# Patient Record
Sex: FEMALE | Race: ASIAN | NOT HISPANIC OR LATINO | Employment: STUDENT | URBAN - METROPOLITAN AREA
[De-identification: names, ages, dates, MRNs, and addresses within clinical notes are randomized per-mention and may not be internally consistent; named-entity substitution may affect disease eponyms.]

---

## 2020-12-09 ENCOUNTER — OFFICE VISIT (OUTPATIENT)
Dept: INTERNAL MEDICINE | Facility: CLINIC | Age: 20
End: 2020-12-09

## 2020-12-09 ENCOUNTER — LAB (OUTPATIENT)
Dept: LAB | Facility: HOSPITAL | Age: 20
End: 2020-12-09

## 2020-12-09 VITALS
TEMPERATURE: 98.6 F | SYSTOLIC BLOOD PRESSURE: 96 MMHG | HEIGHT: 61 IN | HEART RATE: 91 BPM | BODY MASS INDEX: 20.92 KG/M2 | OXYGEN SATURATION: 96 % | DIASTOLIC BLOOD PRESSURE: 64 MMHG | WEIGHT: 110.8 LBS

## 2020-12-09 DIAGNOSIS — L70.9 ACNE, UNSPECIFIED ACNE TYPE: ICD-10-CM

## 2020-12-09 DIAGNOSIS — Z00.00 ENCOUNTER FOR ANNUAL PHYSICAL EXAM: ICD-10-CM

## 2020-12-09 DIAGNOSIS — L85.3 DRY SKIN: ICD-10-CM

## 2020-12-09 DIAGNOSIS — Z86.39 HX OF ELEVATED LIPIDS: ICD-10-CM

## 2020-12-09 DIAGNOSIS — Z86.39 HX OF ELEVATED LIPIDS: Primary | ICD-10-CM

## 2020-12-09 LAB
B-HCG UR QL: NEGATIVE
BASOPHILS # BLD AUTO: 0.05 10*3/MM3 (ref 0–0.2)
BASOPHILS NFR BLD AUTO: 0.8 % (ref 0–1.5)
DEPRECATED RDW RBC AUTO: 40.8 FL (ref 37–54)
EOSINOPHIL # BLD AUTO: 0.06 10*3/MM3 (ref 0–0.4)
EOSINOPHIL NFR BLD AUTO: 0.9 % (ref 0.3–6.2)
ERYTHROCYTE [DISTWIDTH] IN BLOOD BY AUTOMATED COUNT: 12.5 % (ref 12.3–15.4)
HCT VFR BLD AUTO: 42.4 % (ref 34–46.6)
HCV AB SER DONR QL: NORMAL
HGB BLD-MCNC: 14.2 G/DL (ref 12–15.9)
IMM GRANULOCYTES # BLD AUTO: 0.02 10*3/MM3 (ref 0–0.05)
IMM GRANULOCYTES NFR BLD AUTO: 0.3 % (ref 0–0.5)
LYMPHOCYTES # BLD AUTO: 2.36 10*3/MM3 (ref 0.7–3.1)
LYMPHOCYTES NFR BLD AUTO: 35.5 % (ref 19.6–45.3)
MCH RBC QN AUTO: 29.9 PG (ref 26.6–33)
MCHC RBC AUTO-ENTMCNC: 33.5 G/DL (ref 31.5–35.7)
MCV RBC AUTO: 89.3 FL (ref 79–97)
MONOCYTES # BLD AUTO: 0.53 10*3/MM3 (ref 0.1–0.9)
MONOCYTES NFR BLD AUTO: 8 % (ref 5–12)
NEUTROPHILS NFR BLD AUTO: 3.63 10*3/MM3 (ref 1.7–7)
NEUTROPHILS NFR BLD AUTO: 54.5 % (ref 42.7–76)
NRBC BLD AUTO-RTO: 0 /100 WBC (ref 0–0.2)
PLATELET # BLD AUTO: 270 10*3/MM3 (ref 140–450)
PMV BLD AUTO: 9.9 FL (ref 6–12)
RBC # BLD AUTO: 4.75 10*6/MM3 (ref 3.77–5.28)
WBC # BLD AUTO: 6.65 10*3/MM3 (ref 3.4–10.8)

## 2020-12-09 PROCEDURE — 81025 URINE PREGNANCY TEST: CPT

## 2020-12-09 PROCEDURE — 85025 COMPLETE CBC W/AUTO DIFF WBC: CPT

## 2020-12-09 PROCEDURE — 80053 COMPREHEN METABOLIC PANEL: CPT

## 2020-12-09 PROCEDURE — 99385 PREV VISIT NEW AGE 18-39: CPT | Performed by: NURSE PRACTITIONER

## 2020-12-09 PROCEDURE — 86803 HEPATITIS C AB TEST: CPT

## 2020-12-09 PROCEDURE — 84443 ASSAY THYROID STIM HORMONE: CPT

## 2020-12-09 PROCEDURE — 80061 LIPID PANEL: CPT

## 2020-12-09 NOTE — PROGRESS NOTES
"  New Patient Office Visit      Patient Name: Callie Samuel  : 2000   MRN: [id number]   Care Team: Patient Care Team:  Olimpia Osei APRN as PCP - General (Nurse Practitioner)    Chief Complaint:    Chief Complaint   Patient presents with   • Annual Exam     WANTS PHYSICAL AND BLOOD WORK        History of Present Illness: Callie Samuel is a 20 y.o. female who is here today to for annual exam.  Last exam was bit over a year ago with provider outside the [facility].  Patient admits she does not recall the providers name at this time.  Reports she believes she is current on Tdap and HPV vaccines.  Had flu vaccine in October of this year.    She is a student at [employer].  Just finished classes for this semester.  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      Reports she has had elevated lipids in the past and would like to have rechecked at this time.    Denies any major health issues.  States she is interested in starting oral contraceptives for facial acne control.  Acne ongoing now times years, waxing/waning, t-zone area affected and sometimes \"deeper\" acne on chin line.    She exercises 1-2 times per week.  She does not follow any specific diet: take out several times per week, otherwise home prepared meals.    She is sexually active with 1 male partner at this time.  Always uses condoms for protection.    Subjective      Review of Systems:   Review of Systems   Constitutional: Negative for chills, fatigue and fever.   HENT: Negative for congestion, ear pain, postnasal drip, sinus pressure, swollen glands and tinnitus.    Eyes: Negative for visual disturbance.   Respiratory: Negative for cough, chest tightness, shortness of breath and wheezing.    Cardiovascular: Negative for chest pain and palpitations.   Gastrointestinal: Negative for abdominal pain, blood in stool and constipation.   Genitourinary: Negative for breast pain, dysuria and menstrual problem.   Skin: Positive for dry skin. " "Negative for color change.        Positive for acne   Allergic/Immunologic: Negative for environmental allergies.   Neurological: Negative for dizziness, speech difficulty and headache.   Psychiatric/Behavioral: Negative for decreased concentration. The patient is not nervous/anxious.        Past Medical History:   Past Medical History:   Diagnosis Date   • Hyperlipidemia        Past Surgical History:   Past Surgical History:   Procedure Laterality Date   • TEETH EXTRACTION         Family History:   Family History   Problem Relation Age of Onset   • Hyperlipidemia Father    • Hypertension Father    • No Known Problems Brother        Social History:   Social History     Socioeconomic History   • Marital status: Single     Spouse name: Not on file   • Number of children: Not on file   • Years of education: Not on file   • Highest education level: Not on file   Tobacco Use   • Smoking status: Never Smoker   • Smokeless tobacco: Never Used   Substance and Sexual Activity   • Alcohol use: Never     Frequency: Never   • Drug use: Never   • Sexual activity: Yes     Partners: Male     Birth control/protection: Condom       Tobacco History:   Social History     Tobacco Use   Smoking Status Never Smoker   Smokeless Tobacco Never Used       Medications:   No current outpatient medications on file.    Allergies:   No Known Allergies    Objective     Physical Exam:  Vital Signs:   Vitals:    12/09/20 1437   BP: 96/64   BP Location: Left arm   Patient Position: Sitting   Cuff Size: Adult   Pulse: 91   Temp: 98.6 °F (37 °C)   TempSrc: Temporal   SpO2: 96%   Weight: 50.3 kg (110 lb 12.8 oz)   Height: 154.9 cm (61\")  Comment: PER PATIENT   PainSc: 0-No pain     Body mass index is 20.94 kg/m².     Physical Exam  Constitutional:       General: She is not in acute distress.     Appearance: Normal appearance. She is normal weight.   HENT:      Head: Normocephalic and atraumatic.      Right Ear: Tympanic membrane and ear canal normal.    "   Left Ear: Tympanic membrane and ear canal normal.      Nose: Nose normal.      Mouth/Throat:      Mouth: Mucous membranes are moist.      Pharynx: Oropharynx is clear.   Eyes:      Pupils: Pupils are equal, round, and reactive to light.   Neck:      Musculoskeletal: Normal range of motion and neck supple. No muscular tenderness.   Cardiovascular:      Rate and Rhythm: Normal rate and regular rhythm.      Pulses: Normal pulses.      Heart sounds: Normal heart sounds. No murmur.   Pulmonary:      Effort: Pulmonary effort is normal.      Breath sounds: Normal breath sounds. No wheezing or rhonchi.   Abdominal:      General: Abdomen is flat. Bowel sounds are normal. There is no distension.      Palpations: Abdomen is soft. There is no mass.      Tenderness: There is no abdominal tenderness.   Musculoskeletal: Normal range of motion.         General: No tenderness or deformity.      Right lower leg: No edema.      Left lower leg: No edema.   Lymphadenopathy:      Cervical: No cervical adenopathy.   Skin:     General: Skin is warm and dry.      Capillary Refill: Capillary refill takes less than 2 seconds.      Comments: Few scattered acne papules on t-zone area of forehead   Neurological:      General: No focal deficit present.      Mental Status: She is alert and oriented to person, place, and time.   Psychiatric:         Mood and Affect: Mood normal.         Thought Content: Thought content normal.         Judgment: Judgment normal.         Assessment / Plan      Assessment/Plan:   Problems Addressed This Visit      Annual exam  H/O elevated lipids  -  Labs today.  Discussed need for health diet (avoid excess fast food) and encouraged routine exercise with goal of at least 3 times per week.  -  Discussed safe sex practices: continue to always use condoms, even if plan to start OCP for acne management.  - Likely current on immunizations but plan to verify with medical records.  Patient to sign release for these to be  obtained.  - Plan for first pap smear next year (patient just turned 20)  - further instructions pending lab results      Acne  - Discussed possibility of starting OCP for this.  Will check urine preg.  If negative, will send in order for contraceptive  - Advised patient to cleanse face twice daily with BP cleanser.      Screenings due include: Hep C screening.  Immunizations recommended are: none at this time  Routine labs recommended are: CBC, CMP, TSH, Lipids    Plan of care reviewed with patient at the conclusion of today's visit. Education was provided regarding diagnosis and management.  Patient verbalizes understanding of and agreement with management plan.    There are no Patient Instructions on file for this visit.    Follow Up:   Return in about 3 months (around 3/9/2021).    JACK Mcclure  [facility] [address] [facility]    Please note that portions of this note may have been completed with a voice recognition program. Efforts were made to edit the dictations, but occasionally words are mistranscribed.

## 2020-12-10 LAB
ALBUMIN SERPL-MCNC: 4.6 G/DL (ref 3.5–5.2)
ALBUMIN/GLOB SERPL: 1.4 G/DL
ALP SERPL-CCNC: 56 U/L (ref 39–117)
ALT SERPL W P-5'-P-CCNC: 21 U/L (ref 1–33)
ANION GAP SERPL CALCULATED.3IONS-SCNC: 8.7 MMOL/L (ref 5–15)
AST SERPL-CCNC: 20 U/L (ref 1–32)
BILIRUB SERPL-MCNC: 0.3 MG/DL (ref 0–1.2)
BUN SERPL-MCNC: 15 MG/DL (ref 6–20)
BUN/CREAT SERPL: 25 (ref 7–25)
CALCIUM SPEC-SCNC: 9.2 MG/DL (ref 8.6–10.5)
CHLORIDE SERPL-SCNC: 102 MMOL/L (ref 98–107)
CHOLEST SERPL-MCNC: 210 MG/DL (ref 0–200)
CO2 SERPL-SCNC: 25.3 MMOL/L (ref 22–29)
CREAT SERPL-MCNC: 0.6 MG/DL (ref 0.57–1)
GFR SERPL CREATININE-BSD FRML MDRD: 127 ML/MIN/1.73
GFR SERPL CREATININE-BSD FRML MDRD: >150 ML/MIN/1.73
GLOBULIN UR ELPH-MCNC: 3.4 GM/DL
GLUCOSE SERPL-MCNC: 81 MG/DL (ref 65–99)
HDLC SERPL-MCNC: 96 MG/DL (ref 40–60)
LDLC SERPL CALC-MCNC: 107 MG/DL (ref 0–100)
LDLC/HDLC SERPL: 1.1 {RATIO}
POTASSIUM SERPL-SCNC: 3.8 MMOL/L (ref 3.5–5.2)
PROT SERPL-MCNC: 8 G/DL (ref 6–8.5)
SODIUM SERPL-SCNC: 136 MMOL/L (ref 136–145)
TRIGL SERPL-MCNC: 40 MG/DL (ref 0–150)
TSH SERPL DL<=0.05 MIU/L-ACNC: 1.02 UIU/ML (ref 0.27–4.2)
VLDLC SERPL-MCNC: 7 MG/DL (ref 5–40)

## 2020-12-10 RX ORDER — DROSPIRENONE AND ETHINYL ESTRADIOL 0.02-3(28)
1 KIT ORAL DAILY
Qty: 28 TABLET | Refills: 6 | Status: SHIPPED | OUTPATIENT
Start: 2020-12-10 | End: 2021-01-05 | Stop reason: SDUPTHER

## 2021-01-05 RX ORDER — DROSPIRENONE AND ETHINYL ESTRADIOL 0.02-3(28)
1 KIT ORAL DAILY
Qty: 28 TABLET | Refills: 6 | Status: SHIPPED | OUTPATIENT
Start: 2021-01-05 | End: 2021-12-30

## 2021-03-04 ENCOUNTER — TELEPHONE (OUTPATIENT)
Dept: INTERNAL MEDICINE | Facility: CLINIC | Age: 21
End: 2021-03-04

## 2021-03-04 NOTE — TELEPHONE ENCOUNTER
Please let patient know I am okay with doing mychart visit or telemedicine visit for follow up if that works best for her.

## 2021-03-11 ENCOUNTER — TELEMEDICINE (OUTPATIENT)
Dept: INTERNAL MEDICINE | Facility: CLINIC | Age: 21
End: 2021-03-11

## 2021-03-11 DIAGNOSIS — L70.0 ACNE VULGARIS: Primary | ICD-10-CM

## 2021-03-11 PROCEDURE — 99213 OFFICE O/P EST LOW 20 MIN: CPT | Performed by: NURSE PRACTITIONER

## 2021-03-11 NOTE — PROGRESS NOTES
Follow Up Office Visit      Patient Name: Callie Samuel  : 2000   MRN: [id number]   Care Team: Patient Care Team:  Olimpia Osei APRN as PCP - General (Nurse Practitioner)    Chief Complaint:    Chief Complaint   Patient presents with   • Follow-up     acne and start of OCP     You have chosen to receive care through a telehealth visit.  Do you consent to use a video/audio connection for your medical care today?     YES    History of Present Illness: Callie Samuel is a 20 y.o. female who is generally healthy and presents today for follow up on treatment of facial acne with use of oral contraceptives.  She was last seen in 2020 as a new patient to establish care.  At that time, she was started on Deanna contraceptive for the purposes of acne control.  Noted that comprehensive labs were ordered/drawn at that visit.  Results have previously been discussed with patient via phone call.  She denies any additional questions regarding these results at this time.    Reports today that she started Deanna as per recommendations.  Admits that she had mild headache for the first few days of use, but this resolved.  Reports she has been having regular periods each month so far with taking the medication, only periods seem a bit lighter (not as heavy).  Reports today that her facial acne has much improved and she is satisfied with there results of using Deanna.  She would like to continue therapy as prescribed.    Denies any other major health issues/concerns today.    Subjective      Review of Systems:   Review of Systems   Constitutional: Negative for chills, fatigue and fever.   HENT: Negative for congestion, sore throat and swollen glands.    Respiratory: Negative for chest tightness and shortness of breath.    Cardiovascular: Negative for chest pain and palpitations.   Gastrointestinal: Negative for abdominal pain, nausea and vomiting.   Skin:        + for facial acne but improving with taking Deanna  "  Neurological: Negative for headache.       I have reviewed and the following portions of the patient's history were updated as appropriate: past family history, past medical history, past social history, past surgical history and problem list.    Medications:     Current Outpatient Medications:   •  drospirenone-ethinyl estradiol (KATI) 3-0.02 MG per tablet, Take 1 tablet by mouth Daily., Disp: 28 tablet, Rfl: 6    Allergies:   No Known Allergies    Objective     Physical Exam:  Vital Signs:   Vitals:    03/11/21 1902   PainSc: 0-No pain     There is no height or weight on file to calculate BMI.     Physical Exam  With patient's consent, physical exam was conducted via visual telemedicine encounter due to patient's current isolation requirements in the interest of PPE conservation.    Constitutional: No acute distress, awake, alert, nontoxic, normal body habitus  HENT: NCAT  Respiratory: Good effort, nonlabored respirations   Psychiatric: Appropriate affect, good insight and judgement, cooperative  Neurologic: Oriented x 3, speech clear and fluent  Skin: No visible rashes, no jaundice seen on exposed skin through video window.  Few acne lesions noted on area of chin line    Assessment / Plan      Assessment/Plan:   Problems Addressed This Visit    ICD-10-CM ICD-9-CM   1. Acne vulgaris  L70.0 706.1     Acne vulgaris  - Improving with prescription of oral contraceptive therapy (Kati)  - Patient tolerating Kati without AE  - Plan to continue Kati at this time, will send in refills   - Discussed that minor acne lesions that persist may be due to \"mascne\".  Discussed adding a BP peroxide cleanser such as neutrogena or clearasil.  Also may consider trial of OTC Differin gel qhs.  Both of which will be safe to use with Kati therapy.  - Plan to follow up in 9-10 months for annual exam with PAP/pelvic exam.    Plan of care reviewed with patient at the conclusion of today's visit. Education was provided regarding diagnosis and " management.  Patient verbalizes understanding of and agreement with management plan.    There are no Patient Instructions on file for this visit.    Follow Up:   Return in about 10 months (around 1/11/2022).        JACK Mcclure  [facility] [address] [facility]    Please note that portions of this note may have been completed with a voice recognition program. Efforts were made to edit the dictations, but occasionally words are mistranscribed.

## 2021-05-10 ENCOUNTER — PATIENT MESSAGE (OUTPATIENT)
Dept: INTERNAL MEDICINE | Facility: CLINIC | Age: 21
End: 2021-05-10

## 2021-12-30 ENCOUNTER — OFFICE VISIT (OUTPATIENT)
Dept: INTERNAL MEDICINE | Facility: CLINIC | Age: 21
End: 2021-12-30

## 2021-12-30 VITALS
HEIGHT: 60 IN | BODY MASS INDEX: 21.01 KG/M2 | HEART RATE: 88 BPM | WEIGHT: 107 LBS | DIASTOLIC BLOOD PRESSURE: 54 MMHG | TEMPERATURE: 97.1 F | SYSTOLIC BLOOD PRESSURE: 86 MMHG

## 2021-12-30 DIAGNOSIS — L70.0 ACNE VULGARIS: Primary | ICD-10-CM

## 2021-12-30 PROCEDURE — 99213 OFFICE O/P EST LOW 20 MIN: CPT | Performed by: NURSE PRACTITIONER

## 2021-12-30 RX ORDER — DROSPIRENONE AND ETHINYL ESTRADIOL 0.02-3(28)
1 KIT ORAL DAILY
Qty: 28 TABLET | Refills: 6 | Status: SHIPPED | OUTPATIENT
Start: 2021-12-30

## 2021-12-30 NOTE — PROGRESS NOTES
"  Follow Up Office Visit      Patient Name: Callie Samuel  : 2000   MRN: [id number]   Care Team: Patient Care Team:  Olimpia Osei APRN as PCP - General (Nurse Practitioner)    Chief Complaint:    Chief Complaint   Patient presents with   • Contraception       History of Present Illness: Callie Samuel is a 21 y.o. female who is generally healthy presents today for request for oral contraceptive therapy.  She was previously on Kati OCP for the treatment of facial acne and contraceptive therapy.  Reports that her parents insurance changed and she discontinued this medication as it was not covered by her new insurance.    Since that time, she has had another change of insurance and is hopeful that he has will be covered.  States it worked well for the control of her acne symptoms.    She is currently still using OTC therapies of benzoyl peroxide cleanser.  However, still having some acne breakthrough.    She denies having any concerns of STDs or STIs.  Denies any issues of vaginal discharge, dyspareunia, abnormal menstrual cycles.  Her last period was in November, she is due soon.      Subjective        I have reviewed and the following portions of the patient's history were updated as appropriate: past family history, past medical history, past social history, past surgical history and problem list.    Medications:     Current Outpatient Medications:   •  drospirenone-ethinyl estradiol (KATI) 3-0.02 MG per tablet, Take 1 tablet by mouth Daily., Disp: 28 tablet, Rfl: 6    Allergies:   No Known Allergies    Objective     Physical Exam:  Vital Signs:   Vitals:    21 1525   BP: (!) 86/54   BP Location: Left arm   Patient Position: Sitting   Cuff Size: Adult   Pulse: 88   Temp: 97.1 °F (36.2 °C)   TempSrc: Temporal   Weight: 48.5 kg (107 lb)   Height: 153.5 cm (60.43\")   PainSc: 0-No pain     Body mass index is 20.6 kg/m².     Physical Exam  Vitals and nursing note reviewed.   Constitutional:       " General: She is not in acute distress.     Appearance: Normal appearance.   HENT:      Head: Normocephalic and atraumatic.   Eyes:      General: No scleral icterus.  Cardiovascular:      Rate and Rhythm: Normal rate and regular rhythm.   Pulmonary:      Effort: Pulmonary effort is normal. No respiratory distress.      Breath sounds: Normal breath sounds. No wheezing.   Skin:     Comments: Scattered acne lesions noted on the chin line   Neurological:      Mental Status: She is alert.   Psychiatric:         Mood and Affect: Mood normal.         Assessment / Plan      Assessment/Plan:   Problems Addressed This Visit    ICD-10-CM ICD-9-CM   1. Acne vulgaris  L70.0 706.1       Acne  -Continue OTC BP cleanser.  -Plan to restart Deanna oral contraceptive therapy at this time, sent to her pharmacy  -If this is not covered by her insurance, we will change to alternative OCP therapy  -Patient to schedule annual wellness visit for 6 months from now    Plan of care reviewed with patient at the conclusion of today's visit. Education was provided regarding diagnosis and management.  Patient verbalizes understanding of and agreement with management plan.      Follow Up:   Return in about 6 months (around 6/30/2022) for Annual physical.        Olimpia Osei APRNORBERTO  [facility] [address] [facility]    Please note that portions of this note were completed with a voice recognition program.        Answers for HPI/ROS submitted by the patient on 12/24/2021  Please describe your symptoms.: acne  Have you had these symptoms before?: Yes  How long have you been having these symptoms?: Greater than 2 weeks  Please describe any probable cause for these symptoms. : hormones, bacteria, diet  What is the primary reason for your visit?: Other

## 2024-06-27 ENCOUNTER — E-VISIT (OUTPATIENT)
Dept: FAMILY MEDICINE CLINIC | Facility: TELEHEALTH | Age: 24
End: 2024-06-27

## 2024-06-27 NOTE — EXTERNAL PATIENT INSTRUCTIONS
Diagnosis   Urinary tract infection (UTI)   My name is JACK Randle. I'm a healthcare provider at [facility]. After reviewing your interview, I see you have a urinary tract infection (UTI).   Medications   Your pharmacy   [facility] [id number] [facility] [address] [phone]     Prescription   Nitrofurantoin monohydrate/macrocrystalline (100mg): Take 1 capsule by mouth every 12 hours for 5 days for infection. This medication is an antibiotic. Take it exactly as directed. You must finish the entire course of medication, even if you feel better after taking the first few doses.   About your diagnosis   A UTI is an infection of one or more parts of the urinary tract, most commonly the bladder.   Most UTIs are caused by bacteria (usually E. coli) that travel up the urethra and into the bladder. I see that you have some common signs and symptoms of a UTI:    Pain or burning while urinating    Frequent urination    Sudden urge to urinate    Moderate back pain    Symptoms that began shortly after sexual intercourse   Fortunately, most UTIs aren't serious, and they're easily treated with antibiotics. Make sure you take all of the antibiotic pills given to you, even if you start to feel better after the first few doses. Otherwise, the UTI might come back.   What to expect   If you follow this treatment plan, you should start to feel better within 1 to 2 days.   When to seek care   Call us at [phone]   with any sudden or unexpected symptoms.    Symptoms that don't improve or get worse in the next 48 hours    Fever that goes above 101F or lasts longer than 24 hours    Shaking or chills    Nausea or vomiting    Severe flank pain (pain in your back or side) or pain that gets worse   Other treatment    Rest and drink plenty of water    Urinate frequently and when you first feel the urge    Place a heating pad on your back or stomach to help relieve some of the discomfort    Prevention    Drink a lot of liquids to help flush bacteria from your system. Water is best. Try for six to eight, 8-ounce glasses a day on a regular basis.    Urinate often and when you first feel the urge. Bacteria can grow when urine stays in the bladder too long. Urinate after sex to flush away bacteria.    After using the toilet, always wipe from front to back. This step is most important after a bowel movement. Wiping from front to back prevents bacteria normally found in stool from entering the urinary tract.   Your provider   Your diagnosis was provided by JACK Randle, a member of your trusted care team at [facility].   If you have any questions, call us at [phone]  .

## 2024-06-27 NOTE — E-VISIT TREATED
Chief Complaint: Bladder infection (UTI)   Patient introduction   Patient is 23-year-old female. Patient provided the following organ inventory: Presence of vagina.   Patient has had dysuria, frequent urination, and urinary urgency for 4 to 6 days.   Urine is yellow with no unusual odor.   General presentation   No fever. No nausea or vomiting.   No stomach/pelvic pain.   Moderate back pain.   Pain in left flank.   Vaginal symptoms include:    Pain during sex   Has not tried acetaminophen, ibuprofen, or phenazopyridine for current symptoms.   Has not taken antibiotics for current symptoms.   No known history of UTI.   No known history of yeast infections as a result of taking antibiotics for past UTIs.   No history of pyelonephritis. No history of kidney stones.   Had sexual intercourse in the past week. Does not use diaphragm. No unprotected sexual intercourse with a new partner in the last 2 weeks.   Patient is not being treated for diabetes mellitus.   Review of red flags/alarm symptoms:    No recent hospitalizations or nursing home care (last 3 months)    No history of renal failure    No recent history of urologic instrumentation    No anatomic abnormalities of the urinary tract    No abnormal vaginal discharge    No visible vaginal sores    No abnormal vaginal bleeding or spotting   Pregnancy/menstrual status/breastfeeding:   Patient is not pregnant. Patient is not breastfeeding. Regarding date of last menstrual period, patient writes: I don't have periods anymore because I'm taking birth control.   Current medications   Currently taking drospirenone-ethinyl estradiol 3-0.02 MG per tablet and VITAMIN A/VITAMIN D/VITAMIN E.   Medication allergies   None.   Medication contraindication review   Not taking ACE inhibitors and ARBs.   No history of Mark-Danlos syndrome, folate deficiency, G6PD deficiency, high blood pressure, aortic aneurysm or dissection, Marfan syndrome, megaloblastic anemia, mononucleosis,  myasthenia gravis, oliguria/anuria, or peripheral vascular disease.   No known history of amoxicillin-clavulanate-associated cholestatic jaundice or nitrofurantoin-associated cholestatic jaundice.   Past medical history   Immune conditions: No immunocompromising conditions.   No history of cancer.   Patient did not request an excuse note.   Assessment   Uncomplicated acute UTI.   This is the likely diagnosis based on patient's interview responses, including:    Symptom profile   No recent history of kidney stones.   Plan   Medications:    nitrofurantoin monohydrate/macrocrystals 100 mg capsule RX 100mg 1 cap PO q12h 5d for infection. This medication is an antibiotic. Take it exactly as directed. You must finish the entire course of medication, even if you feel better after taking the first few doses. Amount is 10 cap.   The patient's prescription will be sent to:   [facility] [id number]   [address] [facility] [address]   Phone: [phone]     Fax: [phone]   Education:    Condition and causes    Prevention    Treatment and self-care    When to call provider   Follow-up:   Patient to follow up as needed for progression or lack of improvement in symptoms within 3d.   ----------   Electronically signed by JACK Randle on 2024-06-27 at 09:03AM   ----------   Patient Interview Transcript:   Knowing about your anatomy is important for diagnosing and treating UTIs. The gender we have on file for you is female, but we realize this might not tell the whole story. Which of these do you have? Select one.    Vagina   Not selected:    Penis   Which of these symptoms do you have? Select all that apply.    Pain or burning while urinating    Frequent urination    __Sudden urge to urinate and it's hard to hold the urine in __   How long have you had these symptoms? Select one.    4 to 6 days   Not selected:    Less than 24 hours    1 to 3 days    7 to 10 days    More than 10 days   Have you already started  taking antibiotics for your current symptoms? Select one.    No   Not selected:    Yes   Since your current symptoms started, has it been difficult to start, stop, or delay urination? Select one.    No   Not selected:    Yes   What color is your urine? Select one.    Yellow   Not selected:    Clear    Cloudy    Pink or red   Does your urine smell strange (like ammonia) or stronger than usual? Select one.    No   Not selected:    Yes   Do you also have any of these symptoms? Select all that apply.    Back pain   Not selected:    Fever    Nausea    Vomiting    Pain, pressure, or discomfort in the lower abdomen    No   How would you describe your back pain? Select one.    Moderate pain that gets in the way of my daily tasks   Not selected:    Mild, achy pain    Severe, sharp pain that keeps me from my daily tasks   Do you have any flank pain? The flank is the side of the body between the ribs and the hips.    Yes, in my left flank   Not selected:    Yes, in my right flank    Yes, in both my left and right flanks    No   Do you have any of these vaginal symptoms? Select all that apply.    Pain during sex   Not selected:    Abnormal vaginal itching    Unscheduled or abnormal vaginal bleeding or spotting    Visible sores on the vagina    Abnormal vaginal discharge    No   In the past 2 weeks, have you had a medical device or instrument placed in your urinary tract? Examples include catheters, stents, and nephrostomy tubes. Select one.    No   Not selected:    Yes   Have you recently been hospitalized or been a resident of a nursing home or other long-term care facility? This doesn't include emergency room (ER) visits. Select one.    No   Not selected:    Yes, within the last 2 weeks    Yes, within the last 3 months   Kidney failure    No   Not selected:    Within the last year    More than a year ago   Kidney infection (pyelonephritis)    No   Not selected:    Within the last year    More than a year ago   Kidney stones     No   Not selected:    Within the last year    More than a year ago   Kidney transplant    No   Not selected:    Within the last year    More than a year ago   Have you ever been diagnosed with any of these? Select all that apply.    No   Not selected:    Urinary reflux    Bladder diverticula    Single (or horseshoe) kidney    Duplicated urethra   Have you recently held your urine for a long time after you felt the urge to go? Select one.    No   Not selected:    Yes   Have you recently avoided eating or drinking so you wouldn't have the urge to urinate as often? Select one.    No   Not selected:    Yes   Do you use a diaphragm? Select one.    No   Not selected:    Yes   Are you pregnant? Select one.    No   Not selected:    Yes   When was your last menstrual period? If you don't currently have periods or no longer have periods, please briefly explain.    I don't have periods anymore because I'm taking birth control   Are you breastfeeding? Select one.    No   Not selected:    Yes   Have you had sexual intercourse in the past week? Recent sexual intercourse is a risk factor for urinary tract infections. Select one.    Yes   Not selected:    No   Have you had unprotected sexual intercourse with a new partner in the last 2 weeks? Select one.    No   Not selected:    Yes   Have you traveled to any of these countries within the last 3 months? Recent travel to these countries may affect which medication we recommend for your symptoms. Select all that apply.    None of these   Not selected:    [place]    [place]    [place]    [place]   Have you ever had a urinary tract infection (UTI)? A UTI is often called a bladder infection or acute cystitis. Select one.    No, not that I know of   Not selected:    Yes   Have you ever developed a yeast infection as a result of taking antibiotics? Select one.    No, not that I know of   Not selected:    Yes   UTIs may be more serious when other factors are present. Let's address those now.  Are you being treated for type 1 or type 2 diabetes? Select one.    No   Not selected:    Yes   Do you have any of these conditions that can affect the immune system? Scroll to see all options. Select all that apply.    None of these   Not selected:    History of bone marrow transplant    Chronic kidney disease    Chronic liver disease (including cirrhosis)    HIV/AIDS    Inflammatory bowel disease (Crohn's disease or ulcerative colitis)    Lupus    Moderate to severe plaque psoriasis    Multiple sclerosis    Rheumatoid arthritis    Sickle cell anemia    Alpha or beta thalassemia    History of kidney, liver, heart, or other solid organ transplant    History of liver, heart, or other solid organ transplant    History of spleen removal    An autoimmune disorder not listed here (specify)    A condition requiring treatment with long-term use of oral steroids (such as prednisone, prednisolone, or dexamethasone) (specify)   Have you ever been diagnosed with cancer? Select one.    No   Not selected:    Yes, I have cancer now    Yes, but I'm in remission   These last few questions will help us create the right treatment plan for you. Are you being treated for any of these conditions? Select all that apply.    No   Not selected:    Mark-Danlos syndrome    Folate deficiency    G6PD deficiency    High blood pressure    History of aortic aneurysm or dissection    Marfan syndrome    Megaloblastic anemia    Mono (mononucleosis)    Myasthenia gravis    Oliguria or anuria    Peripheral vascular disease   Have you ever had jaundice as a result of taking amoxicillin-clavulanate (Augmentin) or nitrofurantoin (Macrobid)? Select all that apply.    No   Not selected:    Yes, from amoxicillin-clavulanate (Augmentin)    Yes, from nitrofurantoin (Macrobid, Macrodantin)   Are you taking any of these medications? Select all that apply.    No   Not selected:    An ACE inhibitor such as lisinopril, enalapril, captopril, or benazepril    An  angiotensin II receptor blocker (ARB) such as candesartan, irbesartan, losartan, or valsartan   Are you still taking these medications listed in your medical record? If you're not taking any of these, click Next. Select all that apply.    drospirenone-ethinyl estradiol 3-0.02 MG per tablet   Are you taking any other medications, vitamins, or supplements? Select one.    Yes   Not selected:    No   Have you ever had an allergic or bad reaction to any medication? Select one.    No   Not selected:    Yes   Do you need a doctor's note? A doctor's note confirms that you received care today and states when you can return to school or work. It does not contain information about your diagnosis or treatment plan. Your provider will make the final decision on whether to give you a doctor's note. Doctor's notes CANNOT be backdated. Select one.    No   Not selected:    Today only (1 day)    Today and tomorrow (2 days)    3 days   Is there anything you'd like to add about your symptoms? Please limit your comments to the symptoms covered in this interview. If you include comments about other concerns, your provider may recommend that you be seen in person.   The patient did not enter any additional information.   ----------   Medical history   Medical history data does not currently exist for this patient.

## 2025-07-15 ENCOUNTER — OFFICE VISIT (OUTPATIENT)
Dept: INTERNAL MEDICINE | Facility: CLINIC | Age: 25
End: 2025-07-15
Payer: COMMERCIAL

## 2025-07-15 ENCOUNTER — LAB (OUTPATIENT)
Dept: LAB | Facility: HOSPITAL | Age: 25
End: 2025-07-15
Payer: COMMERCIAL

## 2025-07-15 VITALS
WEIGHT: 109 LBS | DIASTOLIC BLOOD PRESSURE: 80 MMHG | HEIGHT: 61 IN | BODY MASS INDEX: 20.58 KG/M2 | OXYGEN SATURATION: 99 % | SYSTOLIC BLOOD PRESSURE: 110 MMHG | HEART RATE: 80 BPM | TEMPERATURE: 98 F

## 2025-07-15 DIAGNOSIS — Z00.00 ENCOUNTER FOR MEDICAL EXAMINATION TO ESTABLISH CARE: Primary | ICD-10-CM

## 2025-07-15 DIAGNOSIS — Z12.4 CERVICAL CANCER SCREENING: ICD-10-CM

## 2025-07-15 DIAGNOSIS — Z00.00 ENCOUNTER FOR MEDICAL EXAMINATION TO ESTABLISH CARE: ICD-10-CM

## 2025-07-15 LAB
ALBUMIN SERPL-MCNC: 4.4 G/DL (ref 3.5–5.2)
ALBUMIN/GLOB SERPL: 1.1 G/DL
ALP SERPL-CCNC: 47 U/L (ref 39–117)
ALT SERPL W P-5'-P-CCNC: 12 U/L (ref 1–33)
ANION GAP SERPL CALCULATED.3IONS-SCNC: 12.7 MMOL/L (ref 5–15)
AST SERPL-CCNC: 19 U/L (ref 1–32)
BASOPHILS # BLD AUTO: 0.06 10*3/MM3 (ref 0–0.2)
BASOPHILS NFR BLD AUTO: 0.8 % (ref 0–1.5)
BILIRUB SERPL-MCNC: 0.3 MG/DL (ref 0–1.2)
BUN SERPL-MCNC: 11 MG/DL (ref 6–20)
BUN/CREAT SERPL: 15.1 (ref 7–25)
CALCIUM SPEC-SCNC: 9.7 MG/DL (ref 8.6–10.5)
CHLORIDE SERPL-SCNC: 103 MMOL/L (ref 98–107)
CHOLEST SERPL-MCNC: 241 MG/DL (ref 0–200)
CO2 SERPL-SCNC: 21.3 MMOL/L (ref 22–29)
CREAT SERPL-MCNC: 0.73 MG/DL (ref 0.57–1)
DEPRECATED RDW RBC AUTO: 40.5 FL (ref 37–54)
EGFRCR SERPLBLD CKD-EPI 2021: 117.9 ML/MIN/1.73
EOSINOPHIL # BLD AUTO: 0.06 10*3/MM3 (ref 0–0.4)
EOSINOPHIL NFR BLD AUTO: 0.8 % (ref 0.3–6.2)
ERYTHROCYTE [DISTWIDTH] IN BLOOD BY AUTOMATED COUNT: 12.6 % (ref 12.3–15.4)
GLOBULIN UR ELPH-MCNC: 4 GM/DL
GLUCOSE SERPL-MCNC: 83 MG/DL (ref 65–99)
HCT VFR BLD AUTO: 41.5 % (ref 34–46.6)
HDLC SERPL-MCNC: 106 MG/DL (ref 40–60)
HGB BLD-MCNC: 14.1 G/DL (ref 12–15.9)
HIV 1+2 AB+HIV1 P24 AG SERPL QL IA: NORMAL
IMM GRANULOCYTES # BLD AUTO: 0.02 10*3/MM3 (ref 0–0.05)
IMM GRANULOCYTES NFR BLD AUTO: 0.3 % (ref 0–0.5)
LDLC SERPL CALC-MCNC: 116 MG/DL (ref 0–100)
LDLC/HDLC SERPL: 1.06 {RATIO}
LYMPHOCYTES # BLD AUTO: 2.53 10*3/MM3 (ref 0.7–3.1)
LYMPHOCYTES NFR BLD AUTO: 32.7 % (ref 19.6–45.3)
MCH RBC QN AUTO: 30.3 PG (ref 26.6–33)
MCHC RBC AUTO-ENTMCNC: 34 G/DL (ref 31.5–35.7)
MCV RBC AUTO: 89.1 FL (ref 79–97)
MONOCYTES # BLD AUTO: 0.59 10*3/MM3 (ref 0.1–0.9)
MONOCYTES NFR BLD AUTO: 7.6 % (ref 5–12)
NEUTROPHILS NFR BLD AUTO: 4.48 10*3/MM3 (ref 1.7–7)
NEUTROPHILS NFR BLD AUTO: 57.8 % (ref 42.7–76)
NRBC BLD AUTO-RTO: 0 /100 WBC (ref 0–0.2)
PLATELET # BLD AUTO: 277 10*3/MM3 (ref 140–450)
PMV BLD AUTO: 9.9 FL (ref 6–12)
POTASSIUM SERPL-SCNC: 4.1 MMOL/L (ref 3.5–5.2)
PROT SERPL-MCNC: 8.4 G/DL (ref 6–8.5)
RBC # BLD AUTO: 4.66 10*6/MM3 (ref 3.77–5.28)
SODIUM SERPL-SCNC: 137 MMOL/L (ref 136–145)
TRIGL SERPL-MCNC: 112 MG/DL (ref 0–150)
TSH SERPL DL<=0.05 MIU/L-ACNC: 1.4 UIU/ML (ref 0.27–4.2)
VLDLC SERPL-MCNC: 19 MG/DL (ref 5–40)
WBC NRBC COR # BLD AUTO: 7.74 10*3/MM3 (ref 3.4–10.8)

## 2025-07-15 PROCEDURE — 84443 ASSAY THYROID STIM HORMONE: CPT

## 2025-07-15 PROCEDURE — 80053 COMPREHEN METABOLIC PANEL: CPT

## 2025-07-15 PROCEDURE — 85025 COMPLETE CBC W/AUTO DIFF WBC: CPT

## 2025-07-15 PROCEDURE — G0432 EIA HIV-1/HIV-2 SCREEN: HCPCS

## 2025-07-15 PROCEDURE — 80061 LIPID PANEL: CPT

## 2025-07-15 PROCEDURE — 83036 HEMOGLOBIN GLYCOSYLATED A1C: CPT

## 2025-07-15 RX ORDER — DROSPIRENONE AND ETHINYL ESTRADIOL 0.02-3(28)
1 KIT ORAL DAILY
Qty: 28 TABLET | Refills: 6 | Status: SHIPPED | OUTPATIENT
Start: 2025-07-15 | End: 2025-07-21 | Stop reason: SDUPTHER

## 2025-07-15 NOTE — LETTER
[facility]  Vaccine Consent Form    Patient Name:  Callie Samuel  Patient :  2000  MRN: [id number]    Vaccine(s) Ordered    Tdap Vaccine Greater Than or Equal To 8yo IM        Screening Checklist  The following questions should be completed prior to vaccination. If you answer “yes” to any question, it does not necessarily mean you should not be vaccinated. It just means we may need to clarify or ask more questions. If a question is unclear, please ask your healthcare provider to explain it.    Yes No   Any fever or moderate to severe illness today (mild illness and/or antibiotic treatment are not contraindications)?     Do you have a history of a serious reaction to any previous vaccinations, such as anaphylaxis, encephalopathy within 7 days, Guillain-Galena syndrome within 6 weeks, seizure?     Have you received any live vaccine(s) (e.g MMR, JUAN) or any other vaccines in the last month (to ensure duplicate doses aren't given)?     Do you have an anaphylactic allergy to latex (DTaP, DTaP-IPV, Hep A, Hep B, MenB, RV, Td, Tdap), baker’s yeast (Hep B, HPV), polysorbates (RSV, nirsevimab, PCV 20 and 21, Rotavirrus, Tdap, Shingrix), or gelatin (JUAN, MMR)?     Do you have an anaphylactic allergy to neomycin (Rabies, JUAN, MMR, IPV, Hep A), polymyxin B (IPV), or streptomycin (IPV)?      Any cancer, leukemia, AIDS, or other immune system disorder? (JUAN, MMR, RV)     Do you have a parent, brother, or sister with an immune system problem (if immune competence of vaccine recipient clinically verified, can proceed)? (MMR, JUAN)     Any recent steroid treatments for >2 weeks, chemotherapy, or radiation treatment? (JUAN, MMR)     Have you received antibody-containing blood transfusions or IVIG in the past 11 months (recommended interval is dependent on product)? (MMR, JUAN)     Have you taken antiviral drugs (acyclovir, famciclovir, valacyclovir for JUAN) in the last 24 or 48 hours, respectively?      Are you pregnant or  "planning to become pregnant within 1 month? (JUAN, MMR, HPV, IPV, MenB, Abrexvy; For Hep B- refer to Engerix-B; For RSV - Abrysvo is indicated for 32-36 weeks of pregnancy from September to January)     For infants, have you ever been told your child has had intussusception or a medical emergency involving obstruction of the intestine (Rotavirus)? If not for an infant, can skip this question.         *Ordering Physicians/APC should be consulted if \"yes\" is checked by the patient or guardian above.  I have received, read, and understand the Vaccine Information Statement (VIS) for each vaccine ordered.  I have considered my or my child's health status as well as the health status of my close contacts.  I have taken the opportunity to discuss my vaccine questions with my or my child's health care provider.   I have requested that the ordered vaccine(s) be given to me or my child.  I understand the benefits and risks of the vaccines.  I understand that I should remain in the clinic for 15 minutes after receiving the vaccine(s).  _________________________________________________________  Signature of Patient or Parent/Legal Guardian ____________________  Date   "

## 2025-07-15 NOTE — PROGRESS NOTES
Office Note     Name: Callie Samuel    : 2000     MRN: [id number]     Chief Complaint  Labs ([facility])    Subjective     History of Present Illness:  Callie Samuel is a 24 y.o. female who presents today for ***    History of Present Illness  The patient is a 24-year-old female who presents today to [facility] and for a physical examination and Pap smear.      Past Medical History:   Past Medical History:   Diagnosis Date    Hyperlipidemia        Past Surgical History:   Past Surgical History:   Procedure Laterality Date    TEETH EXTRACTION         Immunizations:   Immunization History   Administered Date(s) Administered    COVID-19 (PFIZER) Purple Cap Monovalent 2021, 2021    DTaP, Unspecified 2000, 2001, 2001, 2001, 10/05/2004    Flu Vaccine Quad PF >36MO 2017    Fluzone (or Fluarix & Flulaval for VFC) >6mos 10/29/2020    HPV, Unspecified 2011, 10/13/2011, 2012    Hep A, Unspecified 2007, 10/16/2008    Hep B, Unspecified 2000, 2001, 2001    HiB 2000, 2001, 2001, 2001    IPV 2000, 2001, 2001, 10/05/2004    Influenza, Unspecified 10/05/2004, 2006, 2007, 2009, 10/08/2010, 10/13/2011, 10/15/2012, 2013, 10/24/2014, 10/01/2015, 2016    MMR 2001, 10/05/2004    Meningococcal MCV4P (Menactra) 10/08/2010    Meningococcal, Unspecified 2016    Pneumococcal, Unspecified 2000, 2001, 2001, 2001    Tdap 10/08/2010    Varicella 2001, 2009        Medications:     Current Outpatient Medications:     drospirenone-ethinyl estradiol (KATI) 3-0.02 MG per tablet, Take 1 tablet by mouth Daily., Disp: 28 tablet, Rfl: 6    Allergies:   No Known Allergies    Family History:   Family History   Problem Relation Age of Onset    Hyperlipidemia Father     Hypertension Father     No Known Problems Brother     Cancer Maternal  "Grandfather        Social History:   Social History     Socioeconomic History    Marital status: Single   Tobacco Use    Smoking status: Never    Smokeless tobacco: Never   Vaping Use    Vaping status: Never Used   Substance and Sexual Activity    Alcohol use: Not Currently     Alcohol/week: 1.0 standard drink of alcohol     Types: 1 Shots of liquor per week     Comment: Maybe once every few months    Drug use: Never    Sexual activity: Yes     Partners: Male     Birth control/protection: Birth control pill         Objective     Vital Signs  /80   Pulse 80   Temp 98 °F (36.7 °C) (Infrared)   Ht 155 cm (61.02\")   Wt 49.4 kg (109 lb)   SpO2 99%   BMI 20.58 kg/m²   Estimated body mass index is 20.58 kg/m² as calculated from the following:    Height as of this encounter: 155 cm (61.02\").    Weight as of this encounter: 49.4 kg (109 lb).    BMI is within normal parameters. No other follow-up for BMI required.      Physical Exam  Constitutional:       General: She is not in acute distress.     Appearance: Normal appearance. She is not ill-appearing.   HENT:      Right Ear: External ear normal.      Left Ear: External ear normal.   Eyes:      General:         Right eye: No discharge.         Left eye: No discharge.   Cardiovascular:      Rate and Rhythm: Normal rate.      Heart sounds: Normal heart sounds.   Pulmonary:      Effort: Pulmonary effort is normal. No respiratory distress.      Breath sounds: Normal breath sounds. No wheezing.   Abdominal:      General: Bowel sounds are normal. There is no distension.      Palpations: Abdomen is soft.      Tenderness: There is no abdominal tenderness.   Musculoskeletal:         General: Normal range of motion.      Cervical back: Normal range of motion.   Skin:     General: Skin is warm and dry.   Neurological:      Mental Status: She is alert and oriented to person, place, and time. Mental status is at baseline.   Psychiatric:         Mood and Affect: Mood normal.   "       Behavior: Behavior normal.         Thought Content: Thought content normal.         Judgment: Judgment normal.          Assessment and Plan     Assessment & Plan      Assessment & Plan        Counseling was given to {person:[id number]} for the following topics: {topic:28389}.    Follow Up  No follow-ups on file.    {YEFRI [employer] Provider Statement:20668}    JACK FranciscoE [facility] [address]  [facility]  [address] [address]  [phone]

## 2025-07-15 NOTE — PROGRESS NOTES
New Patient Office Visit      Date: 07/15/2025   Patient Name: Callie Samuel  : 2000   MRN: [id number]     Chief Complaint:    Chief Complaint   Patient presents with    Labs     Establish care       History of Present Illness: Callie Samuel is a 24 y.o. female who is here today to establish care.  The patient denies any past medical problems.  She is currently sexually active and uses contraception.  She does not have a period due to her birth control.  She requests cervical cancer screening today.  She reports she eats a combination of foods prepared at home and fast foods.  She reports that she walks as exercise.  She currently works at a [employer] in [employer].  She reports that she seldomly drinks alcohol, stating maybe once every few months.  She denies use of tobacco products and drugs.  She denies feeling hopeless, down, or depressed.      Subjective     Past Medical History:   Past Medical History:   Diagnosis Date    Hyperlipidemia        Past Surgical History:   Past Surgical History:   Procedure Laterality Date    TEETH EXTRACTION         Family History:   Family History   Problem Relation Age of Onset    Hyperlipidemia Father     Hypertension Father     No Known Problems Brother     Cancer Maternal Grandfather        Social History:   Social History     Socioeconomic History    Marital status: Single   Tobacco Use    Smoking status: Never    Smokeless tobacco: Never   Vaping Use    Vaping status: Never Used   Substance and Sexual Activity    Alcohol use: Not Currently     Alcohol/week: 1.0 standard drink of alcohol     Types: 1 Shots of liquor per week     Comment: Maybe once every few months    Drug use: Never    Sexual activity: Yes     Partners: Male     Birth control/protection: Birth control pill       Medications:     Current Outpatient Medications:     drospirenone-ethinyl estradiol (KATI) 3-0.02 MG per tablet, Take 1 tablet by mouth Daily., Disp: 28 tablet, Rfl: 6    Allergies:   No Known  Allergies    Immunizations:  Immunization History   Administered Date(s) Administered    COVID-19 (PFIZER) Purple Cap Monovalent 04/22/2021, 05/12/2021    DTaP, Unspecified 2000, 01/16/2001, 03/14/2001, 12/11/2001, 10/05/2004    Flu Vaccine Quad PF >36MO 09/25/2017    Fluzone (or Fluarix & Flulaval for VFC) >6mos 10/29/2020    HPV, Unspecified 08/02/2011, 10/13/2011, 02/20/2012    Hep A, Unspecified 11/06/2007, 10/16/2008    Hep B, Unspecified 2000, 03/14/2001, 06/19/2001    HiB 2000, 01/16/2001, 03/14/2001, 12/11/2001    IPV 2000, 01/16/2001, 12/11/2001, 10/05/2004    Influenza, Unspecified 10/05/2004, 11/07/2006, 11/06/2007, 09/28/2009, 10/08/2010, 10/13/2011, 10/15/2012, 12/26/2013, 10/24/2014, 10/01/2015, 11/08/2016    MMR 09/25/2001, 10/05/2004    Meningococcal MCV4P (Menactra) 10/08/2010    Meningococcal, Unspecified 11/08/2016    Pneumococcal, Unspecified 2000, 01/16/2001, 03/14/2001, 09/25/2001    Tdap 10/08/2010, 07/15/2025    Varicella 09/25/2001, 09/28/2009       Colorectal Screening: To be completed at age 45  Last Completed Colonoscopy    This patient has no relevant Health Maintenance data.       Pap: Completed today  Last Completed Pap Smear    This patient has no relevant Health Maintenance data.        Mammogram: To be completed at age 40  Last Completed Mammogram    This patient has no relevant Health Maintenance data.          CT for Smoker (Age 50-80, 20 pk yr):  N/A  Bone Density/DEXA (Age 65 or high risk): DEXA scan to be completed at age 65  Hep C (Age 18-79 once):  previously negative  HIV (Age 15-65 once): Ordered  A1c: ordered  Lipid panel: ordered      Dermatology: Declines referral at this time  Ophthalmologist: established  Dentist: Not established, educated on importance of biannual dental screenings.    Tobacco Use: Low Risk  (7/15/2025)    Patient History     Smoking Tobacco Use: Never     Smokeless Tobacco Use: Never     Passive Exposure: Not on file  "      Social History     Substance and Sexual Activity   Alcohol Use Not Currently    Alcohol/week: 1.0 standard drink of alcohol    Types: 1 Shots of liquor per week    Comment: Maybe once every few months        Social History     Substance and Sexual Activity   Drug Use Never        Diet/Physical activity: counseled on 07/16/25      Sexual Health: using contraception, not attempting pregnancy   Menopause: pre-menopausal  Menstrual Cycles: regular, last menstrual cycle: over a year ago    PHQ-9 Depression Screening  Little interest or pleasure in doing things? Not at all   Feeling down, depressed, or hopeless? Not at all   PHQ-2 Total Score 0   Trouble falling or staying asleep, or sleeping too much?     Feeling tired or having little energy?     Poor appetite or overeating?     Feeling bad about yourself - or that you are a failure or have let yourself or your family down?     Trouble concentrating on things, such as reading the newspaper or watching television?     Moving or speaking so slowly that other people could have noticed? Or the opposite - being so fidgety or restless that you have been moving around a lot more than usual?       Thoughts that you would be better off dead, or of hurting yourself in some way?     PHQ-9 Total Score     If you checked off any problems, how difficult have these problems made it for you to do your work, take care of things at home, or get along with other people? Not difficult at all         Objective     Physical Exam:  Vital Signs:   Vitals:    07/15/25 1432   BP: 110/80   Pulse: 80   Temp: 98 °F (36.7 °C)   TempSrc: Infrared   SpO2: 99%   Weight: 49.4 kg (109 lb)   Height: 155 cm (61.02\")   PainSc: 0-No pain     Body mass index is 20.58 kg/m².    Physical Exam  Constitutional:       General: She is not in acute distress.     Appearance: Normal appearance. She is not ill-appearing.   HENT:      Right Ear: External ear normal.      Left Ear: External ear normal.   Eyes:      " General:         Right eye: No discharge.         Left eye: No discharge.   Cardiovascular:      Rate and Rhythm: Normal rate.      Heart sounds: Normal heart sounds.   Pulmonary:      Effort: Pulmonary effort is normal. No respiratory distress.      Breath sounds: Normal breath sounds. No wheezing.   Abdominal:      General: Bowel sounds are normal. There is no distension.      Palpations: Abdomen is soft.      Tenderness: There is no abdominal tenderness.   Genitourinary:     Cervix: Discharge and cervical bleeding present.   Musculoskeletal:         General: Normal range of motion.      Cervical back: Normal range of motion.   Skin:     General: Skin is warm and dry.   Neurological:      Mental Status: She is alert and oriented to person, place, and time. Mental status is at baseline.   Psychiatric:         Mood and Affect: Mood normal.         Behavior: Behavior normal.         Thought Content: Thought content normal.         Judgment: Judgment normal.         Procedures      Assessment / Plan      Assessment/Plan:   Problem List Items Addressed This Visit    None  Visit Diagnoses         Encounter for medical examination to establish care    -  Primary    Relevant Orders    CBC & Differential (Completed)    Comprehensive Metabolic Panel (Completed)    Hemoglobin A1c (Completed)    Lipid Panel (Completed)    TSH Rfx On Abnormal To Free T4 (Completed)    HIV-1 & HIV-2 Antibodies (Completed)    Liquid-based Pap Smear, Screening    LIQUID-BASED PAP SMEAR WITH HPV GENOTYPING IF ASCUS ([facility])      Cervical cancer screening        Relevant Orders    LIQUID-BASED PAP SMEAR WITH HPV GENOTYPING IF ASCUS ([facility])          Assessment & Plan  Health maintenance/encounter to establish care  - Baseline labs ordered labs for anemia, thyroid dysfunction, cholesterol levels, organ function, thyroid function, and HIV testing.  - Pap smear completed today with Nany as my chaperone.  - Discussed the importance of  regular health checkups and screenings.  - Encouraged at least 150 minutes of moderate intensity exercise a week such as brisk walking or otherwise.  - Emphasized a balanced diet rich in fruits, vegetables, whole grains, lean proteins and healthy fats.  - Patient is overdue for her Tdap vaccination, that will be administered today.    Results      Healthcare Maintenance:  Counseling provided based on age appropriate USPSTF guidelines.  BMI is within normal parameters. No other follow-up for BMI required.    Callie Samuel voices understanding and acceptance of this advice and will call back with any further questions or concerns. AVS with preventive healthcare tips printed for patient.     “Discussed risks/benefits to vaccination, reviewed components of the vaccine, discussed VIS, discussed informed consent, informed consent obtained. Patient/Parent was allowed to accept or refuse vaccine. Questions answered to satisfactory state of patient/Parent. We reviewed typical age appropriate and seasonally appropriate vaccinations. Reviewed immunization history and updated state vaccination form as needed. Patient was counseled on Tdap    Follow Up:   Return in about 1 year (around 7/15/2026) for Annual physical.    JACK [facility] [address]  [facility]  [address]  [place] [phone]  Fax [phone]  Phone [phone]

## 2025-07-16 LAB — HBA1C MFR BLD: 5.3 % (ref 4.8–5.6)

## 2025-07-17 LAB — REF LAB TEST METHOD: NORMAL

## 2025-07-21 RX ORDER — DROSPIRENONE AND ETHINYL ESTRADIOL 0.02-3(28)
1 KIT ORAL DAILY
Qty: 28 TABLET | Refills: 6 | Status: SHIPPED | OUTPATIENT
Start: 2025-07-21 | End: 2025-07-21 | Stop reason: SDUPTHER

## 2025-07-21 RX ORDER — DROSPIRENONE AND ETHINYL ESTRADIOL 0.02-3(28)
1 KIT ORAL DAILY
Qty: 28 TABLET | Refills: 6 | Status: SHIPPED | OUTPATIENT
Start: 2025-07-21 | End: 2025-07-28 | Stop reason: SDUPTHER

## 2025-07-28 RX ORDER — DROSPIRENONE AND ETHINYL ESTRADIOL 0.02-3(28)
1 KIT ORAL DAILY
Qty: 90 TABLET | Refills: 3 | Status: SHIPPED | OUTPATIENT
Start: 2025-07-28 | End: 2025-07-28 | Stop reason: SDUPTHER

## 2025-07-28 RX ORDER — DROSPIRENONE AND ETHINYL ESTRADIOL 0.02-3(28)
1 KIT ORAL DAILY
Qty: 90 TABLET | Refills: 3 | Status: SHIPPED | OUTPATIENT
Start: 2025-07-28